# Patient Record
Sex: FEMALE | Race: WHITE
[De-identification: names, ages, dates, MRNs, and addresses within clinical notes are randomized per-mention and may not be internally consistent; named-entity substitution may affect disease eponyms.]

---

## 2019-04-26 ENCOUNTER — HOSPITAL ENCOUNTER (OUTPATIENT)
Dept: HOSPITAL 84 - D.HCCARDIO | Age: 79
Discharge: HOME | End: 2019-04-26
Payer: MEDICARE

## 2019-04-26 DIAGNOSIS — I48.91: Primary | ICD-10-CM

## 2019-04-30 NOTE — EC
PATIENT:CHANELL WOO                       DATE OF SERVICE: 04/26/19
SEX: F                                  MEDICAL RECORD: [id number]
DATE OF BIRTH: [birth year]                        LOCATION:D.[facility]          
AGE OF PATIENT: 78                             ADMISSION DATE: 04/26/19
 
REFERRING PHYSICIAN:                               
 
INTERPRETING PHYSICIAN: CHUY SULTANA MD          
 
 
 
                             ECHOCARDIOGRAM REPORT
  ECHO CHARGES 4               ECHO COMPLETE                 Date: 04/26/19
 
 
 
CLINICAL DIAGNOSIS: H/O A-FIB/HTN                 
 
                         ECHOCARDIOGRAPHIC MEASUREMENTS
      (adult normal given)
   AC root (d.<3.7cm) 3.0  cm   LV Septum d (<1.2 cm> 1.3  cm
      Valve Excursion 1.4  cm     LV Septum (systole) 1.7  cm
Left Atria (s.<4.0cm> 3.4  cm          LVPW d(<1.2cm) 1.2  cm
        RV (d.<2.3cm) 3.0  cm           LVPW (sytole) 1.5  cm
  LV diastole(<5.6CM) 4.1  cm       MV E-F(>70mm/sec)      cm
           LV systole 2.3  cm           LVOT Diameter 1.8  cm
       MV exc.(>10mm)      cm
Est.ejection fraction (50-75%)     %
 
   DOPPLER:
     LVIT      cm/sec A 27.0 cm/sec E 85.0  cm/sec
       LA      cm/sec      RVSP 52.0 mmHg
     LVOT 69.0 cm/sec   AOP1/2T      m/s
  Asc. Ao 126  cm/sec
     RVOT 35.0 cm/sec
       RA      cm/sec
       PA 69.0 cm/sec
 AV Gradient Peak 6.3  mmHg  AV Mean 3.4  mmHg  AV Area 1.4  cm
 MV Gradient Peak 4.8  mmHg  MV Mean 1.2  mmHg  MV Area      cm
   COMMENTS: OP - HC                                      
 
 
 Cardiac Sonographer: 1               EDITH ANABELLE            
      Cardiologist: 3          Dr. Rushing             
             TAPE# PACS           
                                       Pericardial Effusion N                        
 
 
DATE OF SERVICE:  04/26/2019
 
Adequate 2-D echo, color-flow and spectral Doppler, and M-mode.
 
Borderline LVH.  LV internal dimensions are normal.  Wall motion is normal.  EF
is greater than 55%.  Aortic valve is tricuspid.  No evidence of stenosis by
Doppler interrogation.  Left atrium is normal at 3.4 cm.  Mitral valve shows no
prolapse.  Mild MR.  Right-sided chambers are grossly normal.  Mild TR.
 
TRANSINT:[id number] Voice Confirmation ID: [id number] DOCUMENT ID: [id number]
 
 
 
ECHOCARDIOGRAM REPORT                          [id number]    HEAD,CHANELL SULTANA,CHUY WILSON MD          
 
 
 
Electronically Signed by CHUY SULTANA on 04/30/19 at 0849
 
 
 
 
 
 
 
 
 
 
 
 
 
 
 
 
 
 
 
 
 
 
 
 
 
 
 
 
 
 
 
 
 
 
 
 
 
 
 
 
CC:                                                             [phone]
DICTATION DATE: 04/29/19 1446     :     04/29/19 1540      DEP CLI 
                                                                      04/26/19
[place] [phone]